# Patient Record
Sex: FEMALE | Race: WHITE
[De-identification: names, ages, dates, MRNs, and addresses within clinical notes are randomized per-mention and may not be internally consistent; named-entity substitution may affect disease eponyms.]

---

## 2018-03-23 ENCOUNTER — RESULT REVIEW (OUTPATIENT)
Age: 38
End: 2018-03-23

## 2020-09-04 ENCOUNTER — APPOINTMENT (OUTPATIENT)
Dept: OBGYN | Facility: CLINIC | Age: 40
End: 2020-09-04
Payer: COMMERCIAL

## 2020-09-04 VITALS
WEIGHT: 205 LBS | DIASTOLIC BLOOD PRESSURE: 78 MMHG | TEMPERATURE: 97.5 F | HEIGHT: 67 IN | BODY MASS INDEX: 32.18 KG/M2 | SYSTOLIC BLOOD PRESSURE: 116 MMHG

## 2020-09-04 DIAGNOSIS — Z12.4 ENCOUNTER FOR SCREENING FOR MALIGNANT NEOPLASM OF CERVIX: ICD-10-CM

## 2020-09-04 DIAGNOSIS — Z12.11 ENCOUNTER FOR SCREENING FOR MALIGNANT NEOPLASM OF COLON: ICD-10-CM

## 2020-09-04 DIAGNOSIS — Z78.9 OTHER SPECIFIED HEALTH STATUS: ICD-10-CM

## 2020-09-04 DIAGNOSIS — Z12.39 ENCOUNTER FOR OTHER SCREENING FOR MALIGNANT NEOPLASM OF BREAST: ICD-10-CM

## 2020-09-04 DIAGNOSIS — Z01.419 ENCOUNTER FOR GYNECOLOGICAL EXAMINATION (GENERAL) (ROUTINE) W/OUT ABNORMAL FINDINGS: ICD-10-CM

## 2020-09-04 LAB
DATE COLLECTED: NORMAL
HEMOCCULT SP1 STL QL: NEGATIVE
QUALITY CONTROL: YES

## 2020-09-04 PROCEDURE — 82270 OCCULT BLOOD FECES: CPT

## 2020-09-04 PROCEDURE — 99396 PREV VISIT EST AGE 40-64: CPT

## 2020-09-04 RX ORDER — LEVOTHYROXINE SODIUM 200 UG/1
200 TABLET ORAL
Refills: 0 | Status: ACTIVE | COMMUNITY

## 2020-09-04 NOTE — PHYSICAL EXAM
[Awake] : awake [Alert] : alert [Examination Of The Breasts] : a normal appearance [Rt > Lt] : the right breast was larger than the left [No Discharge] : no discharge [No Masses] : no breast masses were palpable [Soft] : soft [Oriented x3] : oriented to person, place, and time [Labia Majora] : labia major [Labia Minora] : labia minora [Pap Obtained] : a Pap smear was performed [Normal] : clitoris [No Bleeding] : there was no active vaginal bleeding [Uterine Adnexae] : were not tender and not enlarged [No Tenderness] : no rectal tenderness [Nl Sphincter Tone] : normal sphincter tone [Acute Distress] : no acute distress [Mass] : no breast mass [Nipple Discharge] : no nipple discharge [Axillary LAD] : no axillary lymphadenopathy [Tender] : non tender [Distended] : not distended [Depressed Mood] : not depressed [Flat Affect] : affect not flat [Occult Blood] : occult blood test from digital rectal exam was negative

## 2020-09-04 NOTE — HISTORY OF PRESENT ILLNESS
[___ Year(s) Ago] : [unfilled] year(s) ago [Good] : being in good health [Healthy Diet] : a healthy diet [Regular Exercise] : regular exercise [Weight Concerns] : weight concens [Last Mammogram ___] : Last Mammogram was [unfilled] [Last Pap ___] : Last cervical pap smear was [unfilled] [No Previous Colonoscopy] : no previous colonoscopy [Perimenopausal] : is perimenopausal [Menstrual Problems] : reports abnormal menses [Menarche Age ____] : age at menarche was [unfilled] [Definite ___ (Date)] : the last menstrual period was [unfilled] [Pregnancy History] : pregnancy history: [Total Preg ___] : [unfilled] [Living ___] : [unfilled] [Full Term ___] : [unfilled] [Definite:  ___ (Date)] : the last menstrual period was [unfilled] [Menarche Age: ____] : age at menarche was [unfilled] [Sexually Active] : is sexually active [Monogamous] : is monogamous [Male ___] : [unfilled] male [Tubal Occlusion] : has had a tubal occlusion [Contraception] : uses contraception [Tubal Ligation] : has had a tubal ligation [de-identified] : LEFT Breast Ultra: 9/1/2020 BR2

## 2020-09-04 NOTE — REVIEW OF SYSTEMS
[Nl] : Integumentary [Incontinence] : incontinence [Painful Periods] : painful periods [Hot Flashes] : hot flashes [Pain During [place]] : pain during intercourse

## 2020-09-07 LAB — HPV HIGH+LOW RISK DNA PNL CVX: NOT DETECTED

## 2020-09-11 ENCOUNTER — APPOINTMENT (OUTPATIENT)
Dept: OBGYN | Facility: CLINIC | Age: 40
End: 2020-09-11
Payer: COMMERCIAL

## 2020-09-11 ENCOUNTER — ASOB RESULT (OUTPATIENT)
Age: 40
End: 2020-09-11

## 2020-09-11 LAB — CYTOLOGY CVX/VAG DOC THIN PREP: NORMAL

## 2020-09-11 PROCEDURE — 76856 US EXAM PELVIC COMPLETE: CPT

## 2020-12-23 PROBLEM — Z01.419 ENCOUNTER FOR ANNUAL ROUTINE GYNECOLOGICAL EXAMINATION: Status: RESOLVED | Noted: 2020-09-04 | Resolved: 2020-12-23

## 2021-04-23 ENCOUNTER — APPOINTMENT (OUTPATIENT)
Dept: OBGYN | Facility: CLINIC | Age: 41
End: 2021-04-23
Payer: COMMERCIAL

## 2021-04-23 VITALS
HEIGHT: 67 IN | SYSTOLIC BLOOD PRESSURE: 148 MMHG | BODY MASS INDEX: 33.94 KG/M2 | WEIGHT: 216.25 LBS | DIASTOLIC BLOOD PRESSURE: 94 MMHG

## 2021-04-23 DIAGNOSIS — G43.909 MIGRAINE, UNSPECIFIED, NOT INTRACTABLE, W/OUT STATUS MIGRAINOSUS: ICD-10-CM

## 2021-04-23 DIAGNOSIS — N92.0 EXCESSIVE AND FREQUENT MENSTRUATION WITH REGULAR CYCLE: ICD-10-CM

## 2021-04-23 PROCEDURE — 36415 COLL VENOUS BLD VENIPUNCTURE: CPT

## 2021-04-23 PROCEDURE — 99213 OFFICE O/P EST LOW 20 MIN: CPT

## 2021-04-23 PROCEDURE — 99072 ADDL SUPL MATRL&STAF TM PHE: CPT

## 2021-04-23 RX ORDER — SUMATRIPTAN 100 MG/1
100 TABLET, FILM COATED ORAL
Refills: 0 | Status: DISCONTINUED | COMMUNITY
End: 2021-04-23

## 2021-04-23 NOTE — REVIEW OF SYSTEMS
[Patient Intake Form Reviewed] : Patient intake form was reviewed [Recent Weight Gain (___ Lbs)] : recent [unfilled] ~Ulb weight gain [Sight Problems] : sight problems [Palpitations] : palpitations [Sleep Disturbances] : sleep disturbances [Negative] : Heme/Lymph

## 2021-04-24 ENCOUNTER — TRANSCRIPTION ENCOUNTER (OUTPATIENT)
Age: 41
End: 2021-04-24

## 2021-04-25 ENCOUNTER — TRANSCRIPTION ENCOUNTER (OUTPATIENT)
Age: 41
End: 2021-04-25

## 2021-04-25 LAB
ESTRADIOL SERPL-MCNC: 6 PG/ML
FERRITIN SERPL-MCNC: 69 NG/ML
FOLATE SERPL-MCNC: 9.2 NG/ML
FSH SERPL-MCNC: 9.7 IU/L
IRON SATN MFR SERPL: 27 %
IRON SERPL-MCNC: 99 UG/DL
LH SERPL-ACNC: 8.1 IU/L
PROLACTIN SERPL-MCNC: 6 NG/ML
T3 SERPL-MCNC: 81 NG/DL
T3FREE SERPL-MCNC: 2.18 PG/ML
T4 FREE SERPL-MCNC: 1 NG/DL
T4 SERPL-MCNC: 6.9 UG/DL
TIBC SERPL-MCNC: 371 UG/DL
TSH SERPL-ACNC: 13.1 UIU/ML
UIBC SERPL-MCNC: 272 UG/DL
VIT B12 SERPL-MCNC: 426 PG/ML

## 2021-04-25 NOTE — END OF VISIT
[FreeTextEntry3] : I, Terri Gudino solely acted as a scribe for Dr. Cole Aguilera on 04/23/2021 . All medical entries made by the scribe were at my, Dr. Aguilera's, direction and personally dictated by me on 04/23/2021 . I have reviewed the chart and agree that the record accurately reflects my personal performance of the history, physical exam, assessment and plan. I have also personally directed, reviewed, and agreed with the chart.

## 2021-04-25 NOTE — PLAN
[FreeTextEntry1] : Steam ablation procedure discussed. Risks and benefits discussed. \par \par Blood work done today to monitor blood count, iron levels, thyroid levels, and female hormone levels. \par \par She will return to office in 2 weeks for pelvic sonogram. \par \par During this visit 20 minutes were spent face- to- face with greater than 50% of the time dedicated to counseling.\par

## 2021-04-25 NOTE — HISTORY OF PRESENT ILLNESS
[Patient reported PAP Smear was normal] : Patient reported PAP Smear was normal [Menarche Age ____] : age at menarche was [unfilled] [Definite ___ (Date)] : the last menstrual period was [unfilled] [Excessive Bleeding] : bleeding has been excessive [Regular Cycle Intervals] : have been regular [Irregular Duration] : has been irregular [Tubal Occlusion] : has had a tubal occlusion [Monogamous (Male Partner)] : is monogamous with a male partner [Y] : Positive pregnancy history [Menarche Age: ____] : age at menarche was [unfilled] [Currently Active] : currently active [Men] : men [Vaginal] : vaginal [No] : No [Patient refuses STI testing] : Patient refuses STI testing [Mammogramdate] : 09/01/20 [TextBox_19] : BR2 [BreastSonogramDate] : 09/01/20 [TextBox_25] : BR2 [Papeardate] : 09/04/20 [TextBox_31] : NEGATIVE [LMPDate] : 04/05/21 [PGHxTotal] : 2 [[facility] [[facility] 2 [FreeTextEntry1] : 04/05/21 [FreeTextEntry3] : TUBAL LIGATION

## 2021-04-27 ENCOUNTER — APPOINTMENT (OUTPATIENT)
Dept: PULMONOLOGY | Facility: CLINIC | Age: 41
End: 2021-04-27
Payer: COMMERCIAL

## 2021-04-27 VITALS — OXYGEN SATURATION: 97 % | HEART RATE: 91 BPM | RESPIRATION RATE: 16 BRPM

## 2021-04-27 VITALS — DIASTOLIC BLOOD PRESSURE: 70 MMHG | BODY MASS INDEX: 33.05 KG/M2 | SYSTOLIC BLOOD PRESSURE: 120 MMHG | WEIGHT: 211 LBS

## 2021-04-27 DIAGNOSIS — R06.2 WHEEZING: ICD-10-CM

## 2021-04-27 DIAGNOSIS — R06.00 DYSPNEA, UNSPECIFIED: ICD-10-CM

## 2021-04-27 DIAGNOSIS — Z86.39 PERSONAL HISTORY OF OTHER ENDOCRINE, NUTRITIONAL AND METABOLIC DISEASE: ICD-10-CM

## 2021-04-27 DIAGNOSIS — Z82.5 FAMILY HISTORY OF ASTHMA AND OTHER CHRONIC LOWER RESPIRATORY DISEASES: ICD-10-CM

## 2021-04-27 DIAGNOSIS — D68.51 ACTIVATED PROTEIN C RESISTANCE: ICD-10-CM

## 2021-04-27 DIAGNOSIS — Z86.79 PERSONAL HISTORY OF OTHER DISEASES OF THE CIRCULATORY SYSTEM: ICD-10-CM

## 2021-04-27 PROCEDURE — 99072 ADDL SUPL MATRL&STAF TM PHE: CPT

## 2021-04-27 PROCEDURE — 99204 OFFICE O/P NEW MOD 45 MIN: CPT

## 2021-04-27 RX ORDER — SUMATRIPTAN 100 MG/1
100 TABLET, FILM COATED ORAL
Refills: 0 | Status: ACTIVE | COMMUNITY

## 2021-04-27 RX ORDER — CHLORHEXIDINE GLUCONATE, 0.12% ORAL RINSE 1.2 MG/ML
0.12 SOLUTION DENTAL
Qty: 473 | Refills: 0 | Status: COMPLETED | COMMUNITY
Start: 2021-04-14

## 2021-04-27 RX ORDER — AMOXICILLIN 500 MG/1
500 CAPSULE ORAL
Qty: 21 | Refills: 0 | Status: COMPLETED | COMMUNITY
Start: 2021-04-14

## 2021-04-27 RX ORDER — OLMESARTAN MEDOXOMIL 20 MG/1
20 TABLET, FILM COATED ORAL
Refills: 0 | Status: ACTIVE | COMMUNITY
Start: 2021-04-27

## 2021-04-27 RX ORDER — ALBUTEROL SULFATE 90 UG/1
INHALANT RESPIRATORY (INHALATION)
Refills: 0 | Status: ACTIVE | COMMUNITY

## 2021-04-27 RX ORDER — ONDANSETRON 4 MG/1
4 TABLET, ORALLY DISINTEGRATING ORAL
Qty: 9 | Refills: 0 | Status: COMPLETED | COMMUNITY
Start: 2021-04-07

## 2021-04-27 NOTE — PHYSICAL EXAM
[No Acute Distress] : no acute distress [Normal Oropharynx] : normal oropharynx [Normal Appearance] : normal appearance [No Neck Mass] : no neck mass [Normal Rate/Rhythm] : normal rate/rhythm [Normal S1, S2] : normal s1, s2 [No Murmurs] : no murmurs [No Resp Distress] : no resp distress [Clear to Auscultation Bilaterally] : clear to auscultation bilaterally [No Abnormalities] : no abnormalities [Benign] : benign [Normal Gait] : normal gait [No Clubbing] : no clubbing [No Cyanosis] : no cyanosis [No Edema] : no edema [FROM] : FROM [Normal Color/ Pigmentation] : normal color/ pigmentation [No Focal Deficits] : no focal deficits [Oriented x3] : oriented x3 [Normal Affect] : normal affect [TextBox_44] : POst thyroidectomy scar.

## 2021-04-27 NOTE — CONSULT LETTER
[Dear  ___] : Dear  [unfilled], [Consult Letter:] : I had the pleasure of evaluating your patient, [unfilled]. [Please see my note below.] : Please see my note below. [Consult Closing:] : Thank you very much for allowing me to participate in the care of this patient.  If you have any questions, please do not hesitate to contact me. [Sincerely,] : Sincerely, [FreeTextEntry3] : Rangel Dill MD\par

## 2021-05-06 ENCOUNTER — APPOINTMENT (OUTPATIENT)
Dept: OBGYN | Facility: CLINIC | Age: 41
End: 2021-05-06
Payer: COMMERCIAL

## 2021-05-06 ENCOUNTER — ASOB RESULT (OUTPATIENT)
Age: 41
End: 2021-05-06

## 2021-05-06 VITALS
SYSTOLIC BLOOD PRESSURE: 116 MMHG | DIASTOLIC BLOOD PRESSURE: 62 MMHG | BODY MASS INDEX: 33.47 KG/M2 | HEIGHT: 67 IN | WEIGHT: 213.25 LBS

## 2021-05-06 PROCEDURE — 99213 OFFICE O/P EST LOW 20 MIN: CPT | Mod: 25

## 2021-05-06 PROCEDURE — 99072 ADDL SUPL MATRL&STAF TM PHE: CPT

## 2021-05-06 PROCEDURE — 76830 TRANSVAGINAL US NON-OB: CPT

## 2021-05-31 NOTE — DISCUSSION/SUMMARY
[FreeTextEntry1] : We reviewed the findings of today's pelvic sonogram, significant for an endometrium measuring 1.11 cm.\par \par Management options for abnormal uterine bleeding was discussed, namely Thalia thermal ablation under sedation. The risks, benefits and alternatives of each procedure was discussed in detail. Patient expressed understanding and is agreeable. Task sent to coordinator to schedule procedure.\par \par All questions were answered. \par \par During this visit 20 minutes were spent face-to-face with greater than 50% of the time dedicated to counseling.

## 2021-05-31 NOTE — HISTORY OF PRESENT ILLNESS
[Patient reported PAP Smear was normal] : Patient reported PAP Smear was normal [Menarche Age ____] : age at menarche was [unfilled] [Definite ___ (Date)] : the last menstrual period was [unfilled] [Excessive Bleeding] : bleeding has been excessive [Regular Cycle Intervals] : have been regular [Irregular Duration] : has been irregular [Tubal Occlusion] : has had a tubal occlusion [Monogamous (Male Partner)] : is monogamous with a male partner [Y] : Positive pregnancy history [Menarche Age: ____] : age at menarche was [unfilled] [Currently Active] : currently active [Men] : men [Vaginal] : vaginal [No] : No [Patient refuses STI testing] : Patient refuses STI testing [TextBox_4] : SONO FOLLOW UP  [Mammogramdate] : 09/01/20 [TextBox_19] : BR2 [BreastSonogramDate] : 09/01/20 [TextBox_25] : BR2 [Papeardate] : 09/04/20 [TextBox_31] : NEG [LMPDate] : 04/24/21 [PGHxTotal] : 2 [[facility]] : 2 [[facility] 2 [FreeTextEntry1] : 04/24/21 [FreeTextEntry3] : TUBAL LIGATION

## 2021-05-31 NOTE — END OF VISIT
[FreeTextEntry3] : I, Sandra Soriano, solely acted as scribe for Dr. Cole Aguilera on 05/06/2021.\par All medical entries made by the Scribe were at my, Dr. Aguilera's direction and personally dictated by me on 05/06/2021. I have reviewed the chart and agree that the record accurately reflects my personal performance of the history, physical exam, assessment and plan. I have also personally directed, reviewed, and agreed with the chart.

## 2021-06-09 DIAGNOSIS — Z01.818 ENCOUNTER FOR OTHER PREPROCEDURAL EXAMINATION: ICD-10-CM

## 2021-06-10 ENCOUNTER — APPOINTMENT (OUTPATIENT)
Dept: DISASTER EMERGENCY | Facility: CLINIC | Age: 41
End: 2021-06-10

## 2021-06-15 ENCOUNTER — APPOINTMENT (OUTPATIENT)
Dept: PULMONOLOGY | Facility: CLINIC | Age: 41
End: 2021-06-15

## 2021-08-26 ENCOUNTER — APPOINTMENT (OUTPATIENT)
Dept: OBGYN | Facility: HOSPITAL | Age: 41
End: 2021-08-26

## 2024-06-20 ENCOUNTER — NON-APPOINTMENT (OUTPATIENT)
Age: 44
End: 2024-06-20

## 2024-06-20 DIAGNOSIS — M10.079 IDIOPATHIC GOUT, UNSPECIFIED ANKLE AND FOOT: ICD-10-CM

## 2025-06-03 ENCOUNTER — APPOINTMENT (OUTPATIENT)
Dept: PODIATRY | Facility: CLINIC | Age: 45
End: 2025-06-03
Payer: MEDICAID

## 2025-06-03 DIAGNOSIS — M79.675 PAIN IN LEFT TOE(S): ICD-10-CM

## 2025-06-03 DIAGNOSIS — B35.1 TINEA UNGUIUM: ICD-10-CM

## 2025-06-03 DIAGNOSIS — M79.674 PAIN IN RIGHT TOE(S): ICD-10-CM

## 2025-06-03 DIAGNOSIS — Z86.39 PERSONAL HISTORY OF OTHER ENDOCRINE, NUTRITIONAL AND METABOLIC DISEASE: ICD-10-CM

## 2025-06-03 DIAGNOSIS — Z82.49 FAMILY HISTORY OF ISCHEMIC HEART DISEASE AND OTHER DISEASES OF THE CIRCULATORY SYSTEM: ICD-10-CM

## 2025-06-03 PROCEDURE — 99204 OFFICE O/P NEW MOD 45 MIN: CPT | Mod: 25

## 2025-06-03 PROCEDURE — 11721 DEBRIDE NAIL 6 OR MORE: CPT

## 2025-06-03 RX ORDER — TIRZEPATIDE 7.5 MG/.5ML
7.5 INJECTION, SOLUTION SUBCUTANEOUS
Refills: 0 | Status: ACTIVE | COMMUNITY

## 2025-06-05 ENCOUNTER — NON-APPOINTMENT (OUTPATIENT)
Age: 45
End: 2025-06-05

## 2025-06-05 PROBLEM — B35.1 ONYCHOMYCOSIS OF TOENAIL: Status: ACTIVE | Noted: 2025-06-05

## 2025-06-05 PROBLEM — M79.674 PAIN OF TOE OF RIGHT FOOT: Status: ACTIVE | Noted: 2025-06-05

## 2025-06-05 PROBLEM — M79.675 PAIN OF TOE OF LEFT FOOT: Status: ACTIVE | Noted: 2025-06-05

## 2025-06-05 RX ORDER — TERBINAFINE HYDROCHLORIDE 250 MG/1
250 TABLET ORAL DAILY
Qty: 60 | Refills: 0 | Status: ACTIVE | COMMUNITY
Start: 2025-06-05 | End: 1900-01-01

## 2025-08-04 ENCOUNTER — APPOINTMENT (OUTPATIENT)
Dept: PODIATRY | Facility: CLINIC | Age: 45
End: 2025-08-04